# Patient Record
(demographics unavailable — no encounter records)

---

## 2025-01-30 NOTE — REVIEW OF SYSTEMS
Has been feeling worse for the past several days.  She complains of some pain across the upper chest and arms.  It is slightly pleuritic.  I told her to resume prednisone 20 mg for 2 days, then 10 mg for 2 days.  If her symptoms recur, she is to contact me and we will discuss other options.   [Negative] : Cardiovascular

## 2025-01-30 NOTE — HISTORY OF PRESENT ILLNESS
[FreeTextEntry1] : 44 year  old female LMP: 1/5/2025 presents for annual GYN exam. Reports normal menses, regular monthly bleeds, last 5 days. Follows Dr. Raines, and she had low iron. She takes iron supplements. She was off of iron supplements for 2 weeks for endoscopy. She will follow up with her PCP in 3 months. Same sexual partner. Denies intermenstrual bleeding, abnormal discharge, itching, or burning.  She has a breast MRI scheduled for March 5, 2025. FamHx: Mother negative for BRCA gene, Her father had mild prostate cancer resolved from radiation Medications: iron supplements. [Y] : Patient is sexually active [Monogamous (Male Partner)] : is monogamous with a male partner [Mammogramdate] : 9/2024 [PapSmeardate] : 2022 [ColonoscopyDate] : 2024 [PGHxTotal] : 4 [[facility]] : 4 [[facility]Living] : 4

## 2025-01-30 NOTE — END OF VISIT
[FreeTextEntry3] : I, Madhavi Zapata, acted as a scribe on behalf of Dr. Madyson Salas M.D. on 01/30/2025.   All medical entries made by the scribe were at my, Dr. Madyson Salas M.D., direction and personally dictated by me on 01/30/2025. I have reviewed the chart and agree that the record accurately reflects my personal performance of the history, physical exam, assessment and plan. I have also personally directed, reviewed, and agreed with the chart.

## 2025-01-30 NOTE — PHYSICAL EXAM
[Chaperone Present] : A chaperone was present in the examining room during all aspects of the physical examination [Appropriately responsive] : appropriately responsive [Alert] : alert [No Acute Distress] : no acute distress [Soft] : soft [Non-tender] : non-tender [Non-distended] : non-distended [No HSM] : No HSM [No Lesions] : no lesions [No Mass] : no mass [Oriented x3] : oriented x3 [Examination Of The Breasts] : a normal appearance [No Masses] : no breast masses were palpable [Labia Majora] : normal [Labia Minora] : normal [Normal] : normal [Uterine Adnexae] : normal [06695] : A chaperone was present during the pelvic exam. [FreeTextEntry2] : EVANS [No Lymphadenopathy] : no lymphadenopathy

## 2025-01-30 NOTE — PLAN
[FreeTextEntry1] : 44 year  old female LMP: 1/5/2025 presents for annual GYN exam.  Plan: - HPV/PAP done today. - - Referral for breast MRI given.  RTO in one year for annual/PRN.